# Patient Record
Sex: FEMALE
[De-identification: names, ages, dates, MRNs, and addresses within clinical notes are randomized per-mention and may not be internally consistent; named-entity substitution may affect disease eponyms.]

---

## 2022-08-22 ENCOUNTER — APPOINTMENT (OUTPATIENT)
Dept: TRANSGENDER CARE | Facility: CLINIC | Age: 22
End: 2022-08-22

## 2022-08-22 ENCOUNTER — LABORATORY RESULT (OUTPATIENT)
Age: 22
End: 2022-08-22

## 2022-08-22 ENCOUNTER — NON-APPOINTMENT (OUTPATIENT)
Age: 22
End: 2022-08-22

## 2022-08-22 VITALS
HEART RATE: 72 BPM | WEIGHT: 190 LBS | OXYGEN SATURATION: 99 % | DIASTOLIC BLOOD PRESSURE: 70 MMHG | HEIGHT: 67 IN | SYSTOLIC BLOOD PRESSURE: 106 MMHG | BODY MASS INDEX: 29.82 KG/M2

## 2022-08-22 DIAGNOSIS — Z78.9 OTHER SPECIFIED HEALTH STATUS: ICD-10-CM

## 2022-08-22 DIAGNOSIS — F32.A ANXIETY DISORDER, UNSPECIFIED: ICD-10-CM

## 2022-08-22 DIAGNOSIS — E05.00 THYROTOXICOSIS WITH DIFFUSE GOITER W/OUT THYROTOXIC CRISIS OR STORM: ICD-10-CM

## 2022-08-22 DIAGNOSIS — Z80.0 FAMILY HISTORY OF MALIGNANT NEOPLASM OF DIGESTIVE ORGANS: ICD-10-CM

## 2022-08-22 DIAGNOSIS — F64.9 GENDER IDENTITY DISORDER, UNSPECIFIED: ICD-10-CM

## 2022-08-22 DIAGNOSIS — F41.9 ANXIETY DISORDER, UNSPECIFIED: ICD-10-CM

## 2022-08-22 DIAGNOSIS — Z82.49 FAMILY HISTORY OF ISCHEMIC HEART DISEASE AND OTHER DISEASES OF THE CIRCULATORY SYSTEM: ICD-10-CM

## 2022-08-22 DIAGNOSIS — Z80.51 FAMILY HISTORY OF MALIGNANT NEOPLASM OF KIDNEY: ICD-10-CM

## 2022-08-22 DIAGNOSIS — Z83.3 FAMILY HISTORY OF DIABETES MELLITUS: ICD-10-CM

## 2022-08-22 PROCEDURE — 36415 COLL VENOUS BLD VENIPUNCTURE: CPT

## 2022-08-22 PROCEDURE — 99205 OFFICE O/P NEW HI 60 MIN: CPT | Mod: 25

## 2022-08-22 RX ORDER — LEVOTHYROXINE SODIUM 0.07 MG/1
75 TABLET ORAL
Qty: 90 | Refills: 0 | Status: ACTIVE | COMMUNITY
Start: 2022-05-31

## 2022-08-22 RX ORDER — VENLAFAXINE 75 MG/1
75 TABLET ORAL
Qty: 90 | Refills: 0 | Status: ACTIVE | COMMUNITY
Start: 2022-05-19

## 2022-08-22 RX ORDER — PROPYLTHIOURACIL 50 MG/1
50 TABLET ORAL
Qty: 540 | Refills: 0 | Status: ACTIVE | COMMUNITY
Start: 2021-12-13

## 2022-08-22 RX ORDER — LAMOTRIGINE 100 MG/1
100 TABLET ORAL
Qty: 90 | Refills: 0 | Status: ACTIVE | COMMUNITY
Start: 2022-05-19

## 2022-08-22 NOTE — PLAN
[FreeTextEntry1] : \par \par Gender dysphoria: discussed with patient/mom that with graves disease, patient would be a good to work with one of our endos who can manage both. Patient will obtain MH clearance from therapist and have letter sent. Will order routine blood work and f/u results to patient once made available. GAHT to be started pending normal labs and MH clearance letter. Patient was provided with resources/referrals at time of visit. Advised patient to call with any questions or concerns. Patient verbalized understanding.

## 2022-08-22 NOTE — HISTORY OF PRESENT ILLNESS
[FreeTextEntry1] : establish care [de-identified] : ADEBAYO GALINDO is a 21 year old, transmale seen on 08/22/2022 for initial transgender care program intake visit.\par \par Preferred Pronouns: he/him\par Sexual orientation: bisexual\par Gender identity: transmale\par Assigned female at birth\par Specific Terms for Body Parts:medical terms okay\par Call pt: Adebayo\par \par COVID Screening: \par \par Vaccinated, 3 shots.\par \par Presenting Problems or Unmet Needs: \par \par Grave’s disease & Hashimoto's thyroiditis - sees an endo- takes PCU and levothyroxine. Patient reports seeing endo every 3-6 months, diagnosed senior of high school\par allergy - cats and dogs\par \par “Goals” \par \par Start GHT\par \par Transition HX + Present Life: \par \par Relays it has been a long process, he started exploring his gender identity in 10th grade, has a trans male best friend who he came out to him as non-binary but then never did more than that. In 2020 had lots of trans online friends and he started examining his feelings more again and settled on trans male. Not out to extended family, lives with mom and sister who are supportive. Reports mom supportive, trying to understand but generational differences and thinks everything is a phase for pt per pt. Patient reports that he just came out to mom 3 months ago, knows it is a long process getting started on everything but is ready to begin journey\par \par Education | Employment: \par \par Works at [employer] full time, out at work, work is supportive.\par \par Mental Health: \par \par Adebayo has ADHD and depression, takes Lamictal and Effexor, relays he also has trouble sleeping, anxiety, and past SI not current. Sees therapist Madelyn Corey every other week (therapist with phd will be referring pts to other providers), and meds are prescribed by Fabi Booth. No psych inpatient, no family psych hx, no hx of violence.\par \par Endocrinology, Primary Care, GYN: \par \par No PCP currently, last pediatrician visit was in 2020. Has Grave’s disease and Hashimoto’s thyroiditis. [facility] beginning of august. No GYN visit.\par \par Coping: \par \par Likes to cosplay, style wigs, and write.\par \par Feelings of Gender Dysphoria/ Body Dysmorphia: \par \par Relays voice and chest can cause distress.\par \par Gender Presentation: \par \par Binds with a GC2B binder, denies any pain, bruising, or shortness of breath, usually wears for 7-8 hours.\par \par Tattoos/ Piercing: \par \par Ears pierced, professionally done.\par \par Cigarette, Vaping, Marijuana, Alcohol, and Drug Use:\par \par Drinks alcohol about once or twice a month. Denies any nicotine, marijuana, or drug use.\par \par Reproductive Endocrinology: \par \par Yes, would like resources for egg freezing.\par \par Gender Affirming Surgery: \par \par Interested in top surgery, would like referrals.\par \par Name Change + Gender Marker: \par \par Would like resources.\par \par Nutrition: \par \par Sexual Health: \par \par Adebayo is single, never been sexually active. No hx of STIs/HIV.\par  \par Goals of Trans care:\par \par 1) patient interested in starting on gaht\par 2) resources/ referrals\par 3) possibly pcp

## 2022-08-23 LAB
25(OH)D3 SERPL-MCNC: 24.2 NG/ML
ALBUMIN SERPL ELPH-MCNC: 4.5 G/DL
ALP BLD-CCNC: 63 U/L
ALT SERPL-CCNC: 21 U/L
ANION GAP SERPL CALC-SCNC: 11 MMOL/L
AST SERPL-CCNC: 23 U/L
BASOPHILS # BLD AUTO: 0.03 K/UL
BASOPHILS NFR BLD AUTO: 0.8 %
BILIRUB SERPL-MCNC: 0.4 MG/DL
BUN SERPL-MCNC: 11 MG/DL
CALCIUM SERPL-MCNC: 9.2 MG/DL
CHLORIDE SERPL-SCNC: 104 MMOL/L
CO2 SERPL-SCNC: 24 MMOL/L
CREAT SERPL-MCNC: 0.68 MG/DL
EGFR: 128 ML/MIN/1.73M2
EOSINOPHIL # BLD AUTO: 0.02 K/UL
EOSINOPHIL NFR BLD AUTO: 0.5 %
ESTIMATED AVERAGE GLUCOSE: 100 MG/DL
ESTRADIOL SERPL-MCNC: 100 PG/ML
FSH SERPL-MCNC: 5.3 IU/L
GLUCOSE SERPL-MCNC: 89 MG/DL
HBA1C MFR BLD HPLC: 5.1 %
HBV CORE IGG+IGM SER QL: NONREACTIVE
HBV SURFACE AB SERPL IA-ACNC: <3 MIU/ML
HBV SURFACE AG SER QL: NONREACTIVE
HCT VFR BLD CALC: 36.9 %
HCV AB SER QL: NONREACTIVE
HCV S/CO RATIO: 0.08 S/CO
HEPATITIS A IGG ANTIBODY: REACTIVE
HGB BLD-MCNC: 11.6 G/DL
IMM GRANULOCYTES NFR BLD AUTO: 0 %
LH SERPL-ACNC: 9.8 IU/L
LYMPHOCYTES # BLD AUTO: 1.54 K/UL
LYMPHOCYTES NFR BLD AUTO: 38.6 %
MAN DIFF?: NORMAL
MCHC RBC-ENTMCNC: 26.3 PG
MCHC RBC-ENTMCNC: 31.4 GM/DL
MCV RBC AUTO: 83.7 FL
MONOCYTES # BLD AUTO: 0.35 K/UL
MONOCYTES NFR BLD AUTO: 8.8 %
NEUTROPHILS # BLD AUTO: 2.05 K/UL
NEUTROPHILS NFR BLD AUTO: 51.3 %
PLATELET # BLD AUTO: 331 K/UL
POTASSIUM SERPL-SCNC: 4.2 MMOL/L
PROLACTIN SERPL-MCNC: 15.3 NG/ML
PROT SERPL-MCNC: 6.9 G/DL
RBC # BLD: 4.41 M/UL
RBC # FLD: 15.2 %
SODIUM SERPL-SCNC: 138 MMOL/L
T3FREE SERPL-MCNC: 2.5 PG/ML
T4 FREE SERPL-MCNC: 1.4 NG/DL
TESTOST SERPL-MCNC: 44.9 NG/DL
TSH SERPL-ACNC: 1.44 UIU/ML
WBC # FLD AUTO: 3.99 K/UL

## 2022-08-24 LAB
APPEARANCE: CLEAR
BILIRUBIN URINE: NEGATIVE
BLOOD URINE: NEGATIVE
COLOR: YELLOW
GLUCOSE QUALITATIVE U: NEGATIVE
KETONES URINE: NEGATIVE
LEUKOCYTE ESTERASE URINE: NEGATIVE
NITRITE URINE: NEGATIVE
PH URINE: 7
PROTEIN URINE: NORMAL
SHBG SERPL-SCNC: 64.8 NMOL/L
SPECIFIC GRAVITY URINE: 1.02
UROBILINOGEN URINE: ABNORMAL

## 2022-08-30 LAB
MONOMERIC PROLACTIN (ICMA)*: 14.6 NG/ML
PERCENT MACROPROLACTIN: 12 %
PROLACTIN, SERUM (ICMA)*: 16.6 NG/ML